# Patient Record
Sex: FEMALE | Race: BLACK OR AFRICAN AMERICAN | NOT HISPANIC OR LATINO | Employment: UNEMPLOYED | URBAN - METROPOLITAN AREA
[De-identification: names, ages, dates, MRNs, and addresses within clinical notes are randomized per-mention and may not be internally consistent; named-entity substitution may affect disease eponyms.]

---

## 2020-04-08 ENCOUNTER — PATIENT MESSAGE (OUTPATIENT)
Dept: FAMILY MEDICINE | Facility: CLINIC | Age: 25
End: 2020-04-08

## 2023-11-03 ENCOUNTER — OFFICE VISIT (OUTPATIENT)
Dept: URGENT CARE | Facility: CLINIC | Age: 28
End: 2023-11-03
Payer: COMMERCIAL

## 2023-11-03 VITALS
DIASTOLIC BLOOD PRESSURE: 76 MMHG | HEIGHT: 62 IN | SYSTOLIC BLOOD PRESSURE: 136 MMHG | BODY MASS INDEX: 46.01 KG/M2 | RESPIRATION RATE: 17 BRPM | OXYGEN SATURATION: 98 % | WEIGHT: 250 LBS | HEART RATE: 90 BPM | TEMPERATURE: 99 F

## 2023-11-03 DIAGNOSIS — Z76.89 ENCOUNTER FOR INCISION AND DRAINAGE PROCEDURE: ICD-10-CM

## 2023-11-03 DIAGNOSIS — K04.7 DENTAL ABSCESS: Primary | ICD-10-CM

## 2023-11-03 PROCEDURE — 99499 NO LOS: ICD-10-PCS | Mod: S$GLB,,, | Performed by: STUDENT IN AN ORGANIZED HEALTH CARE EDUCATION/TRAINING PROGRAM

## 2023-11-03 PROCEDURE — 42700 INCISION & DRAINAGE: ICD-10-PCS | Mod: S$GLB,,, | Performed by: STUDENT IN AN ORGANIZED HEALTH CARE EDUCATION/TRAINING PROGRAM

## 2023-11-03 PROCEDURE — 99499 UNLISTED E&M SERVICE: CPT | Mod: S$GLB,,, | Performed by: STUDENT IN AN ORGANIZED HEALTH CARE EDUCATION/TRAINING PROGRAM

## 2023-11-03 PROCEDURE — 42700 I&D ABSCESS PERITONSILLAR: CPT | Mod: S$GLB,,, | Performed by: STUDENT IN AN ORGANIZED HEALTH CARE EDUCATION/TRAINING PROGRAM

## 2023-11-03 RX ORDER — LIDOCAINE HYDROCHLORIDE 10 MG/ML
1 INJECTION INFILTRATION; PERINEURAL
Status: COMPLETED | OUTPATIENT
Start: 2023-11-03 | End: 2023-11-03

## 2023-11-03 RX ORDER — IBUPROFEN 800 MG/1
800 TABLET ORAL 3 TIMES DAILY
Qty: 21 TABLET | Refills: 0 | Status: SHIPPED | OUTPATIENT
Start: 2023-11-03

## 2023-11-03 RX ORDER — EMPAGLIFLOZIN 25 MG/1
25 TABLET, FILM COATED ORAL
COMMUNITY
Start: 2023-10-03

## 2023-11-03 RX ORDER — LEVOFLOXACIN 750 MG/1
750 TABLET ORAL DAILY
Qty: 7 TABLET | Refills: 0 | Status: SHIPPED | OUTPATIENT
Start: 2023-11-03

## 2023-11-03 RX ORDER — LOSARTAN POTASSIUM AND HYDROCHLOROTHIAZIDE 12.5; 1 MG/1; MG/1
1 TABLET ORAL
COMMUNITY
Start: 2023-10-03

## 2023-11-03 RX ORDER — METFORMIN HYDROCHLORIDE 1000 MG/1
1000 TABLET ORAL 2 TIMES DAILY
COMMUNITY
Start: 2023-10-25

## 2023-11-03 RX ORDER — CLINDAMYCIN HYDROCHLORIDE 300 MG/1
300 CAPSULE ORAL 3 TIMES DAILY
COMMUNITY
Start: 2023-10-30

## 2023-11-03 RX ORDER — CHLORHEXIDINE GLUCONATE ORAL RINSE 1.2 MG/ML
15 SOLUTION DENTAL 2 TIMES DAILY
Qty: 420 ML | Refills: 0 | Status: SHIPPED | OUTPATIENT
Start: 2023-11-03 | End: 2023-11-17

## 2023-11-03 RX ADMIN — LIDOCAINE HYDROCHLORIDE 1 ML: 10 INJECTION INFILTRATION; PERINEURAL at 03:11

## 2023-11-03 NOTE — PROCEDURES
Incision & Drainage    Date/Time: 11/3/2023 10:15 AM    Performed by: Connie Domingo MD  Authorized by: Connie Domingo MD    Consent Done?:  Yes (Verbal)    Type:  Abscess  Body area:  Mouth  Location details:  Peritonsillar  Local anesthetic: Lidocaine 1% without epinephrine  Complexity:  Simple  Wound treatment:  Incision, drainage and expression of material  Packing material:  None  Patient tolerance:  Patient tolerated the procedure well with no immediate complications  Pain Assessment: 2    There was a small amount of pus that came out of the abscess

## 2023-11-03 NOTE — PATIENT INSTRUCTIONS
Please go to [facility] at 8 am on Monday 11/6 for emergency dental care.  If the swelling or pain gets worse then please go to the ER.

## 2023-11-03 NOTE — PROGRESS NOTES
"Subjective:      Patient ID: Elizabeth Burt is a 28 y.o. female.    Vitals:  height is 5' 2" (1.575 m) and weight is 113.4 kg (250 lb). Her oral temperature is 98.7 °F (37.1 °C). Her blood pressure is 136/76 and her pulse is 90. Her respiration is 17 and oxygen saturation is 98%.     Chief Complaint: Dental Injury    Patient presents with inner mouth swelling and pain from this morning. Patient [place] [facility] [place] [place] PO antibiotics last week. They did not try to I&D the abscess and did not give her any IV abx.  Patient had teeth pulled last year and suspects an issue. The swelling started two days ago and has gotten worse. She states that they left a small piece because it was close to a nerve. She has not finished the clindamycin yet. She is not sure how long the prescription for her antibiotics is for.    Dental Injury   This is a new problem. The current episode started today. The problem occurs constantly. The problem has been unchanged. The pain is at a severity of 10/10. The pain is severe. Associated symptoms include difficulty swallowing and facial pain. Pertinent negatives include no fever, oral bleeding, sinus pressure or thermal sensitivity. The treatment provided no relief.       Constitution: Negative for fever.   HENT:  Negative for sinus pressure, sore throat and trouble swallowing.    Cardiovascular:  Negative for chest pain.   Respiratory:  Negative for shortness of breath.       Objective:     Physical Exam   HENT:   Mouth/Throat: Dental abscesses and dental caries present.       The pt has poor dentition  There was a foul smell coming from the patient's mouth  There was a significant amount of swelling on the right side of her face where the affected tooth was located      Comments: The pt has poor dentition  There was a foul smell coming from the patient's mouth  There was a significant amount of swelling on the right side of her face where the affected tooth was " located      Assessment:     1. Dental abscess    2. Encounter for incision and drainage procedure        Plan:       Dental abscess  -     Incision & Drainage  -     Incision and drainage; Future  -     LIDOcaine HCL 10 mg/ml (1%) injection 1 mL    Encounter for incision and drainage procedure  -     chlorhexidine (PERIDEX) 0.12 % solution; Use as directed 15 mLs in the mouth or throat 2 (two) times daily. for 14 days  Dispense: 420 mL; Refill: 0  -     ibuprofen (ADVIL,MOTRIN) 800 MG tablet; Take 1 tablet (800 mg total) by mouth 3 (three) times daily.  Dispense: 21 tablet; Refill: 0  -     levoFLOXacin (LEVAQUIN) 750 MG tablet; Take 1 tablet (750 mg total) by mouth once daily.  Dispense: 7 tablet; Refill: 0  -     LIDOcaine HCL 10 mg/ml (1%) injection 1 mL      Please go to [facility] at 8 am on Monday 11/6 for emergency dental care.  If the swelling or pain gets worse then please go to the ER.   -patient can continue clindamycin  -ED precautions given    Medical Decision Making:   Differential Diagnosis:   Dental abscess  Urgent Care Management:  This patient is a 28-year-old female who comes in with a dental abscess.  The patient stated that she would noticed some swelling about 2 or 3 days ago.  The patient stated that she went to the ER last weekend and was given clindamycin antibiotics.  The patient has significant swelling of her right cheek with a huge amount of pain.  I saw an abscess that was located in the right upper molars in the back.  I proceeded to do incision and drainage of the abscess.  After that I wash the patient's mouth with normal saline several times.  The patient was numbed before the incision and drainage was done.  The patient was sent home with levofloxacin and with a Peridex mouthwash.  Also give the patient ibuprofen 800 mg to help with the pain.  The patient will follow up with [facility] on Monday at 8:00 a.m. urgent dental care. I tried to call [facility] today but both of the dentists  were out of office. I tried several other dental practices but they do not take the patient's insurance. ED and return precautions were given. The patient vu.   Incision & Drainage    Date/Time: 11/3/2023 10:15 AM    Performed by: Connie Domingo MD  Authorized by: Connie Domingo MD    Consent Done?:  Yes (Verbal)    Type:  Abscess  Body area:  Mouth  Location details:  Peritonsillar  Local anesthetic: Lidocaine 1% without epinephrine  Complexity:  Simple  Wound treatment:  Incision, drainage and expression of material  Packing material:  None  Patient tolerance:  Patient tolerated the procedure well with no immediate complications  Pain Assessment: 2    There was a small amount of pus that came out of the abscess